# Patient Record
Sex: MALE | Race: WHITE | NOT HISPANIC OR LATINO
[De-identification: names, ages, dates, MRNs, and addresses within clinical notes are randomized per-mention and may not be internally consistent; named-entity substitution may affect disease eponyms.]

---
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
## 2023-05-25 ENCOUNTER — RX ONLY (RX ONLY)
Age: 18
End: 2023-05-25

## 2023-05-25 ENCOUNTER — OFFICE (OUTPATIENT)
Dept: URBAN - METROPOLITAN AREA CLINIC 12 | Facility: CLINIC | Age: 18
Setting detail: OPHTHALMOLOGY
End: 2023-05-25
Payer: COMMERCIAL

## 2023-05-25 DIAGNOSIS — H10.45: ICD-10-CM

## 2023-05-25 PROBLEM — H52.7 REFRACTIVE ERROR: Status: ACTIVE | Noted: 2023-05-25

## 2023-05-25 PROCEDURE — 92004 COMPRE OPH EXAM NEW PT 1/>: CPT | Performed by: OPHTHALMOLOGY

## 2023-05-25 ASSESSMENT — TONOMETRY
OS_IOP_MMHG: 19
OD_IOP_MMHG: 17

## 2023-05-25 ASSESSMENT — AXIALLENGTH_DERIVED
OD_AL: 23.8266
OS_AL: 23.9708

## 2023-05-25 ASSESSMENT — KERATOMETRY
OS_K2POWER_DIOPTERS: 43.25
OD_K2POWER_DIOPTERS: 43.50
OS_K1POWER_DIOPTERS: 42.25
OD_K1POWER_DIOPTERS: 42.50
OS_AXISANGLE_DEGREES: 165
OD_AXISANGLE_DEGREES: 021

## 2023-05-25 ASSESSMENT — VISUAL ACUITY
OD_BCVA: 20/20-2
OS_BCVA: 20/20-2

## 2023-05-25 ASSESSMENT — REFRACTION_AUTOREFRACTION
OS_AXIS: 073
OS_SPHERE: +0.50
OD_SPHERE: +0.50
OD_AXIS: 099
OS_CYLINDER: -1.50
OD_CYLINDER: -1.25

## 2023-05-25 ASSESSMENT — REFRACTION_MANIFEST
OD_SPHERE: PLANO
OD_CYLINDER: -1.00
OD_AXIS: 095
OS_SPHERE: PLANO
OD_VA1: 20/20
OS_VA1: 20/20
OS_AXIS: 080
OS_CYLINDER: -1.25

## 2023-05-25 ASSESSMENT — CONFRONTATIONAL VISUAL FIELD TEST (CVF)
OD_FINDINGS: FULL
OS_FINDINGS: FULL

## 2023-05-25 ASSESSMENT — SPHEQUIV_DERIVED
OD_SPHEQUIV: -0.125
OS_SPHEQUIV: -0.25

## 2024-02-15 ENCOUNTER — APPOINTMENT (OUTPATIENT)
Dept: ORTHOPEDIC SURGERY | Facility: CLINIC | Age: 19
End: 2024-02-15
Payer: COMMERCIAL

## 2024-02-15 DIAGNOSIS — Z78.9 OTHER SPECIFIED HEALTH STATUS: ICD-10-CM

## 2024-02-15 DIAGNOSIS — S62.644A NONDISPLACED FRACTURE OF PROXIMAL PHALANX OF RIGHT RING FINGER, INITIAL ENCOUNTER FOR CLOSED FRACTURE: ICD-10-CM

## 2024-02-15 PROCEDURE — 99203 OFFICE O/P NEW LOW 30 MIN: CPT

## 2024-02-15 PROCEDURE — 73140 X-RAY EXAM OF FINGER(S): CPT | Mod: RT

## 2024-02-15 NOTE — HISTORY OF PRESENT ILLNESS
[7] : 7 [4] : 4 [Dull/Aching] : dull/aching [Sharp] : sharp [Shooting] : shooting [de-identified] : 18 year old RHD male with pain in the right ring finger, he was struck in the finger with the puck during a game, on 2/9/23 and has persistent pain and swelling. No prior history of injury.  [] : no [FreeTextEntry1] : right hand, ring finger  [FreeTextEntry5] : playing hockey on 2/9/24, was blocking shot when he got hit with the puck in right ring finger. No prior treatment. Swelling and bruising, sharp/shooting tight pain.

## 2024-02-15 NOTE — PHYSICAL EXAM
[4th Finger] : 4th finger [Proximal Phalanx] : proximal phalanx [4th] : 4th [PIP Joint] : PIP joint [Right] : right fingers [The fracture is in acceptable alignment. There is progression in healing seen] : The fracture is in acceptable alignment. There is progression in healing seen [] : no deformity [de-identified] : full RO right 4th MCP. [FreeTextEntry9] : non displaced right 4th proximal phalanx fracture.

## 2024-02-15 NOTE — DISCUSSION/SUMMARY
[de-identified] : proximal phalanx fracture  !) manuel loop 2) He can participate in hockey. 3) repeat Xray 2 weeks.

## 2025-06-30 ENCOUNTER — APPOINTMENT (OUTPATIENT)
Dept: ORTHOPEDIC SURGERY | Facility: CLINIC | Age: 20
End: 2025-06-30
Payer: COMMERCIAL

## 2025-06-30 VITALS — HEIGHT: 69 IN | BODY MASS INDEX: 25.92 KG/M2 | WEIGHT: 175 LBS

## 2025-06-30 PROCEDURE — 73502 X-RAY EXAM HIP UNI 2-3 VIEWS: CPT

## 2025-06-30 PROCEDURE — 99203 OFFICE O/P NEW LOW 30 MIN: CPT
